# Patient Record
Sex: MALE | Race: WHITE | NOT HISPANIC OR LATINO | Employment: PART TIME | URBAN - METROPOLITAN AREA
[De-identification: names, ages, dates, MRNs, and addresses within clinical notes are randomized per-mention and may not be internally consistent; named-entity substitution may affect disease eponyms.]

---

## 2022-05-03 ENCOUNTER — OFFICE VISIT (OUTPATIENT)
Dept: OTOLARYNGOLOGY | Facility: CLINIC | Age: 77
End: 2022-05-03
Payer: MEDICARE

## 2022-05-03 VITALS
HEIGHT: 69 IN | SYSTOLIC BLOOD PRESSURE: 134 MMHG | DIASTOLIC BLOOD PRESSURE: 78 MMHG | BODY MASS INDEX: 29.92 KG/M2 | WEIGHT: 202 LBS

## 2022-05-03 DIAGNOSIS — G47.33 OSA (OBSTRUCTIVE SLEEP APNEA): Primary | ICD-10-CM

## 2022-05-03 DIAGNOSIS — Z78.9 INTOLERANCE OF CONTINUOUS POSITIVE AIRWAY PRESSURE (CPAP) VENTILATION: ICD-10-CM

## 2022-05-03 PROCEDURE — 1126F AMNT PAIN NOTED NONE PRSNT: CPT | Mod: CPTII,S$GLB,, | Performed by: OTOLARYNGOLOGY

## 2022-05-03 PROCEDURE — 3078F DIAST BP <80 MM HG: CPT | Mod: CPTII,S$GLB,, | Performed by: OTOLARYNGOLOGY

## 2022-05-03 PROCEDURE — 1101F PT FALLS ASSESS-DOCD LE1/YR: CPT | Mod: CPTII,S$GLB,, | Performed by: OTOLARYNGOLOGY

## 2022-05-03 PROCEDURE — 1159F MED LIST DOCD IN RCRD: CPT | Mod: CPTII,S$GLB,, | Performed by: OTOLARYNGOLOGY

## 2022-05-03 PROCEDURE — 99999 PR PBB SHADOW E&M-NEW PATIENT-LVL IV: CPT | Mod: PBBFAC,,, | Performed by: OTOLARYNGOLOGY

## 2022-05-03 PROCEDURE — 3288F PR FALLS RISK ASSESSMENT DOCUMENTED: ICD-10-PCS | Mod: CPTII,S$GLB,, | Performed by: OTOLARYNGOLOGY

## 2022-05-03 PROCEDURE — 1160F RVW MEDS BY RX/DR IN RCRD: CPT | Mod: CPTII,S$GLB,, | Performed by: OTOLARYNGOLOGY

## 2022-05-03 PROCEDURE — 1126F PR PAIN SEVERITY QUANTIFIED, NO PAIN PRESENT: ICD-10-PCS | Mod: CPTII,S$GLB,, | Performed by: OTOLARYNGOLOGY

## 2022-05-03 PROCEDURE — 1101F PR PT FALLS ASSESS DOC 0-1 FALLS W/OUT INJ PAST YR: ICD-10-PCS | Mod: CPTII,S$GLB,, | Performed by: OTOLARYNGOLOGY

## 2022-05-03 PROCEDURE — 99203 OFFICE O/P NEW LOW 30 MIN: CPT | Mod: S$GLB,,, | Performed by: OTOLARYNGOLOGY

## 2022-05-03 PROCEDURE — 3075F SYST BP GE 130 - 139MM HG: CPT | Mod: CPTII,S$GLB,, | Performed by: OTOLARYNGOLOGY

## 2022-05-03 PROCEDURE — 99999 PR PBB SHADOW E&M-NEW PATIENT-LVL IV: ICD-10-PCS | Mod: PBBFAC,,, | Performed by: OTOLARYNGOLOGY

## 2022-05-03 PROCEDURE — 1160F PR REVIEW ALL MEDS BY PRESCRIBER/CLIN PHARMACIST DOCUMENTED: ICD-10-PCS | Mod: CPTII,S$GLB,, | Performed by: OTOLARYNGOLOGY

## 2022-05-03 PROCEDURE — 3078F PR MOST RECENT DIASTOLIC BLOOD PRESSURE < 80 MM HG: ICD-10-PCS | Mod: CPTII,S$GLB,, | Performed by: OTOLARYNGOLOGY

## 2022-05-03 PROCEDURE — 3075F PR MOST RECENT SYSTOLIC BLOOD PRESS GE 130-139MM HG: ICD-10-PCS | Mod: CPTII,S$GLB,, | Performed by: OTOLARYNGOLOGY

## 2022-05-03 PROCEDURE — 99203 PR OFFICE/OUTPT VISIT, NEW, LEVL III, 30-44 MIN: ICD-10-PCS | Mod: S$GLB,,, | Performed by: OTOLARYNGOLOGY

## 2022-05-03 PROCEDURE — 3288F FALL RISK ASSESSMENT DOCD: CPT | Mod: CPTII,S$GLB,, | Performed by: OTOLARYNGOLOGY

## 2022-05-03 PROCEDURE — 1159F PR MEDICATION LIST DOCUMENTED IN MEDICAL RECORD: ICD-10-PCS | Mod: CPTII,S$GLB,, | Performed by: OTOLARYNGOLOGY

## 2022-05-03 RX ORDER — HYDRALAZINE HYDROCHLORIDE 25 MG/1
25 TABLET, FILM COATED ORAL
COMMUNITY

## 2022-05-03 RX ORDER — CLONAZEPAM 1 MG/1
1 TABLET ORAL 2 TIMES DAILY PRN
COMMUNITY
Start: 2022-04-23

## 2022-05-03 RX ORDER — FINASTERIDE 5 MG/1
5 TABLET, FILM COATED ORAL
COMMUNITY
Start: 2021-11-18

## 2022-05-03 RX ORDER — CYANOCOBALAMIN (VITAMIN B-12) 500 MCG
TABLET ORAL
COMMUNITY

## 2022-05-03 RX ORDER — ASPIRIN 81 MG/1
81 TABLET ORAL
COMMUNITY

## 2022-05-03 RX ORDER — VIT C/E/ZN/COPPR/LUTEIN/ZEAXAN 250MG-90MG
1000 CAPSULE ORAL
COMMUNITY

## 2022-05-03 RX ORDER — ATORVASTATIN CALCIUM 80 MG/1
80 TABLET, FILM COATED ORAL DAILY
COMMUNITY
Start: 2022-04-23

## 2022-05-03 RX ORDER — TELMISARTAN 80 MG/1
1 TABLET ORAL DAILY
COMMUNITY
Start: 2021-10-29

## 2022-05-03 RX ORDER — CYCLOBENZAPRINE HCL 10 MG
10 TABLET ORAL
COMMUNITY
Start: 2022-04-04

## 2022-05-03 RX ORDER — MECLIZINE HCL 12.5 MG 12.5 MG/1
12.5 TABLET ORAL 3 TIMES DAILY PRN
COMMUNITY
Start: 2022-04-09

## 2022-05-03 RX ORDER — APIXABAN 5 MG/1
5 TABLET, FILM COATED ORAL 2 TIMES DAILY
COMMUNITY
Start: 2022-03-29

## 2022-05-03 RX ORDER — ESOMEPRAZOLE MAGNESIUM 40 MG/1
40 CAPSULE, DELAYED RELEASE ORAL EVERY MORNING
COMMUNITY
Start: 2022-04-23

## 2022-05-03 NOTE — PROGRESS NOTES
Subjective:       Patient ID: Grupo Downs is a 76 y.o. male.    Chief Complaint: Procedure (Inspire- having trouble sleeping, doesn't like using CPAP)    Grupo is here for Obstructive sleep apnea and intolerance of CPAP.  Diagnosed with NEFTALI  > 15 years ago.  Has approx 30 lb weight loss since that time.   He has tried CPAP before but cannot tolerate it. He has not used CPAP in a long time.   He report sleep quality has been much better overall. He lost his wife 3 years ago and no bedtime partners.     Body mass index is 29.83 kg/m².     Also had vertigo 6 mos ago when he got up quickly. Hit his head. No LOC. He gets lightheadedness when he gets up. No vertigo.      Patient validated questionnaires (if applicable):      %       No flowsheet data found.  No flowsheet data found.  No flowsheet data found.         Social History     Tobacco Use   Smoking Status Not on file   Smokeless Tobacco Not on file     Social History     Substance and Sexual Activity   Alcohol Use None          Objective:        Constitutional:   He is oriented to person, place, and time. He appears well-developed and well-nourished. He appears alert. He is active. Normal speech.      Head:  Normocephalic and atraumatic. Head is without TMJ tenderness. No scars. Salivary glands normal.  Facial strength is normal.      Ears:    Right Ear: No drainage or swelling. No middle ear effusion.   Left Ear: No drainage or swelling.  No middle ear effusion.     Nose:  No mucosal edema, rhinorrhea, sinus tenderness or septal deviation (small ant perforation, clean). No turbinate hypertrophy.      Mouth/Throat  Oropharynx clear and moist without lesions or asymmetry and mirror exam normal. Normal dentition. No lacerations or trismus. No oropharyngeal exudate. Tonsillar erythema, tonsillar exudate.    UPPP changes      Neck:  Full range of motion with neck supple and no adenopathy. Thyroid tenderness is present. No tracheal deviation, no edema, no  erythema, normal range of motion, no stridor, no crepitus and no neck rigidity present. No thyroid mass present.     Cardiovascular:   Intact distal pulses and normal pulses.      Pulmonary/Chest:   Effort normal and breath sounds normal. No stridor.     Psychiatric:   His speech is normal and behavior is normal. His mood appears not anxious. His affect is not labile.     Neurological:   He is alert and oriented to person, place, and time. No sensory deficit.     Skin:   No abrasions, lacerations, lesions, or rashes. No abrasion and no bruising noted.         Tests / Results:  none    Assessment:       1. NEFTALI (obstructive sleep apnea)    2. Intolerance of continuous positive airway pressure (CPAP) ventilation          Plan:         Discussed background of NEFTALI at length including medical therapy and surgical therapy    Weight loss over time and sleep is better at this time.  Monitor sleep and RTC if worsening. Consider repeat PSG at that time if so.

## 2022-05-03 NOTE — PATIENT INSTRUCTIONS
Information regarding Hypoglossal Nerve Stimulation Therapy:    Hypoglossal Nerve Stimulation Therapy (HGNS) is a surgical treatment for sleep apnea when a patient fails standard positive pressure (CPAP) therapy. This is an effective surgical therapy for sleep apnea with long term effectiveness in many patients.    What is HGNS and how does it work?  The hypoglossal nerve is the nerve that controls nearly all of the tongue movements.   During sleep, the tongue will often fall into the back of the throat. This causes obstruction and apneas by narrowing the throat.  By targeting the tongue muscles, we can often control the prevention of tongue collapse during sleep thereby preventing collapse and apnea.  This procedure involves inserting an implant over the muscles of the chest wall (similar to a pacemaker.) This implant is connected to the nerve that moves the tongue forward. During sleep, the implant will detect breathing effort, and during inspiration, it will cause a gentle pulse of the tongue forward to prevent collapse.   The machine can be turned on and off, and it can be dialed up or down depending on strength needed to move the tongue forward.    Will I feel it? Will it affect my sleep at night?  Once the implant is in place, it is activated after 1 month. After this, you will begin increasing the settings to find the most comfortable setting for you. We work hard with you to get the right settings.   A majority of patients are not disturbed at night with the device, and satisfaction is extremely high (95%.) This is especially true when compared to wearing an external device (CPAP.)   A post-titration sleep study is typically obtained about 3 months after surgery to assess response.     Am I a Candidate?  Candidacy for HGNS is changing over time; however, current general candidacy requirements are:  Moderate or Severe Sleep Apnea (AHI 15-65) as measured by a recent sleep study  Body Mass Index (BMI) < 35    Failure to tolerate CPAP therapy  Pre-operative endoscopy exam confirming candidacy (performed during a quick outpatient procedure)  No contraindications to implant  Approval by insurance company  **The current device is contraindicated for MRI of the thoracic, shoulder, or abdomen area. If you need regular images of this area, this device is not appropriate for you at this time.     How is the surgery performed:  The surgery is usually performed in an outpatient setting, under general anesthesia.  The procedure generally takes a few hours.  The procedure involved two incisions: one in the neck, just below the jaw line. The other is in the chest between the 2nd and third rib. The surgical scars generally heal very well and are minimally noticeable.   Please let your doctor know if you have had surgery or significant trauma to the chest or neck.     What are the risks?  General surgical risks, similar to any procedure include  Bleeding or hematoma  Numbness over incision site  Incisional scar  Pain over incision site  Infection of implant requiring revision or replacement (<1%)  Intolerance to the therapy resulting in non-usage    Specific risks to this surgery include:  Temporary tongue weakness or tingling, rarely permanent (< 1%)  Mechanical failure of the implant  Temporary or permanent weakness to the muscles of the lip (< 1%)  Stimulation related discomfort or tongue abrasions (8%)  Pneumothorax (dropped lung)    After care:  Instructions will be given following the surgery  Generally, light activity for 2 weeks is recommended.  Blood thinners are held prior to surgery at the discretion of the surgeon and can usually be resumed within 48 hours.     Post-operative timeline:    WEEK 1:  About 7 days after your procedure, you will return to the office for a follow up visit. At this time any surtures that we placed will be removed and your incisions will be checked by the pysician. Please note that device will  remain inactive for ONE MONTH. This is refered to as your healing phase. We want to give your body time to heal before we turn the device on. During this time it would be benefical for you to watch the vidoes on the inspire arnoldo. Simply download the arnoldo if you have not done so, then click on the RESOURCES tab. From there, scrol to the INSPIRE CARE section. There are vidoes here that walk you though this phase and will prepare you for the activation visit. Focus on the vidoes entitled: Rest and Heal, Learning your sleep remote, and Tuning Inspire. This will help you feel educated and empowered for your visit.     WEEK 4:  At the one month visit you will come to the office for the device activation. Please wear a shirt or blouse that can allow the physician to check all the incisions and access the implant. Your physician will use a  to check the device for proper function and turn it on for you to begin using Inspire therapy. The process of activation is NOT painful. At this visit you will be given your remote control and instructions on how to use. You will return home to begin using your therapy with the goal of using it ALL NIGHT, EVERY NIGHT. If you have any issues with discomfort, please call the office so we can help you reach this goal.    WEEK 6-8:  Someone from our office will be doing a check in call to see how you are progressing. If you are having any issues, we can bring you in to the office and make some appropratie adjustments as needed. Otherwise, you will continue to keep stepping up your therapy and using it all night.     WEEK 12:  Roughly 3 months after your device is turned on, you will return to the sleep lab. This will help us determine if your therapy is at the appropriate level for you. A couple of weeks after your sleep study, you will return to the office to review your results and make any changes that your provider feels necessary.       FAQ:  I don't feel the stimulation?    Answer: During the tuning phase, you will get used to stimulation.  It's very common to not feel stimulation.  Try stepping up your level.    The stimulation does not synchronize with my breathing?    Answer: This is normal.  Stimulation timing is designed to work best while you are asleep.  Your breathing pattern is much different while you are awake.  When you go to sleep, the sensor will work normally.     Can I receive an MRI?    Answer: If you have the model 3028 generator it is MRI Conditional, make sure your doctor goes to the Inspire website for more information on how to perform a safe MRI for you.     Can I go to the airport?    Answer: Yes.  You may go through metal detectors and scanners.  Make sure to show the [employer]SA official your medical device registration card and tell them you have an implantable device in your body.  They may require extra screening, so make sure to plan for extra time your first time going to the airport.     Can I go scuba diving and mountain climbing?    Answer: You can go to as high in elevation as you want.  However, you can only go 30 meters below sea level or 4 atmospheres of pressure.     Can I continue welding?    Answer: Inspire does not recommend electrical welding.  Consult your patient manual for more details.    Can I go to the dentist?    Answer: Going to the dentist is perfectly fine. Let your dentist know you have an implantable device and the dentist will take any precautions needed.     Can I receive X-Rays or CT Scans?    Answer: Yes.  X-Ray and CT Scans are safe with Inspire.  If you are having a mammogram though, make sure to let the technician know where your device is implanted so the technician can make the mammogram as comfortable as possible.     Why is stimulation on when I'm awake?    Answer: This is normal.  Inspire works by stimulating during every breath.  Inspire does not know when you are asleep or awake, so once you turn on Inspire, it will  stimulate with each breath. Remember, you are in complete control of your Inspire and can turn Inspire ON or OFF using your Inspire Sleep Remote.     Why is stimulation off when I wake up?    Answer: You may have slept longer than the Therapy Duration Setting, which by default is 8 hours.  Inspire will turn off automatically after 8 hours.  If you are sleeping longer than 8 hours, ask your physician to change the duration setting to a longer time (for example 9 or 10 hours).  It is also possible that you are getting used to Inspire and just don't feel the stimulation when you wake up.  If you're sleep remote is green when you gently shake it, your Inspire is still on.  If your Inspire is white, then it is no longer stimulating.       More information can be found at:  [email or web address]

## 2023-09-22 DIAGNOSIS — G45.0 VERTEBROBASILAR ARTERY SYNDROME: Primary | ICD-10-CM

## 2023-09-24 ENCOUNTER — HOSPITAL ENCOUNTER (OUTPATIENT)
Dept: RADIOLOGY | Facility: HOSPITAL | Age: 78
Discharge: HOME | End: 2023-09-24
Attending: INTERNAL MEDICINE
Payer: MEDICARE

## 2023-09-24 DIAGNOSIS — G45.0 VERTEBROBASILAR ARTERY SYNDROME: ICD-10-CM

## 2023-09-24 PROCEDURE — A9585 GADOBUTROL INJECTION: HCPCS

## 2023-09-24 PROCEDURE — 70553 MRI BRAIN STEM W/O & W/DYE: CPT | Mod: TC

## 2023-09-24 PROCEDURE — 70553 MRI BRAIN STEM W/O & W/DYE: CPT | Mod: 26,,, | Performed by: RADIOLOGY

## 2023-09-24 PROCEDURE — 25500020 PHARM REV CODE 255

## 2023-09-24 PROCEDURE — 70553 MRI BRAIN W WO CONTRAST: ICD-10-PCS | Mod: 26,,, | Performed by: RADIOLOGY

## 2023-09-24 RX ORDER — GADOBUTROL 604.72 MG/ML
INJECTION INTRAVENOUS
Status: COMPLETED
Start: 2023-09-24 | End: 2023-09-24

## 2023-09-24 RX ADMIN — GADOBUTROL 9 ML: 604.72 INJECTION INTRAVENOUS at 01:09

## 2023-10-02 LAB
CREAT SERPL-MCNC: 1.8 MG/DL (ref 0.5–1.4)
SAMPLE: ABNORMAL